# Patient Record
Sex: MALE | Employment: FULL TIME | URBAN - METROPOLITAN AREA
[De-identification: names, ages, dates, MRNs, and addresses within clinical notes are randomized per-mention and may not be internally consistent; named-entity substitution may affect disease eponyms.]

---
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
## 2022-04-29 ENCOUNTER — APPOINTMENT (OUTPATIENT)
Dept: GENERAL RADIOLOGY | Age: 22
End: 2022-04-29

## 2022-04-29 ENCOUNTER — ANESTHESIA (OUTPATIENT)
Dept: OPERATING ROOM | Age: 22
End: 2022-04-29

## 2022-04-29 ENCOUNTER — HOSPITAL ENCOUNTER (EMERGENCY)
Age: 22
Discharge: HOME | End: 2022-04-29

## 2022-04-29 ENCOUNTER — APPOINTMENT (OUTPATIENT)
Dept: CT IMAGING | Age: 22
End: 2022-04-29

## 2022-04-29 ENCOUNTER — ANESTHESIA EVENT (OUTPATIENT)
Dept: OPERATING ROOM | Age: 22
End: 2022-04-29

## 2022-04-29 VITALS
TEMPERATURE: 96.4 F | OXYGEN SATURATION: 95 % | RESPIRATION RATE: 18 BRPM | SYSTOLIC BLOOD PRESSURE: 100 MMHG | DIASTOLIC BLOOD PRESSURE: 51 MMHG

## 2022-04-29 VITALS
WEIGHT: 195.4 LBS | SYSTOLIC BLOOD PRESSURE: 140 MMHG | TEMPERATURE: 97.9 F | OXYGEN SATURATION: 97 % | DIASTOLIC BLOOD PRESSURE: 89 MMHG | RESPIRATION RATE: 16 BRPM | BODY MASS INDEX: 28.94 KG/M2 | HEIGHT: 69 IN | HEART RATE: 82 BPM
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
## 2022-04-29 DIAGNOSIS — S62.353B: Primary | ICD-10-CM

## 2022-04-29 PROBLEM — S60.552A FOREIGN BODY OF LEFT HAND: Status: ACTIVE | Noted: 2022-04-29

## 2022-04-29 LAB
A/G RATIO: 2.5 (ref 1.1–2.2)
ALBUMIN SERPL-MCNC: 5 G/DL (ref 3.4–5)
ALP BLD-CCNC: 45 U/L (ref 40–129)
ALT SERPL-CCNC: 23 U/L (ref 10–40)
ANION GAP SERPL CALCULATED.3IONS-SCNC: 12 MMOL/L (ref 3–16)
AST SERPL-CCNC: 22 U/L (ref 15–37)
BASOPHILS ABSOLUTE: 0 K/UL (ref 0–0.2)
BASOPHILS RELATIVE PERCENT: 0.3 %
BILIRUB SERPL-MCNC: 0.3 MG/DL (ref 0–1)
BUN BLDV-MCNC: 15 MG/DL (ref 7–20)
CALCIUM SERPL-MCNC: 9.9 MG/DL (ref 8.3–10.6)
CHLORIDE BLD-SCNC: 101 MMOL/L (ref 99–110)
CO2: 24 MMOL/L (ref 21–32)
CREAT SERPL-MCNC: 0.8 MG/DL (ref 0.9–1.3)
EOSINOPHILS ABSOLUTE: 0 K/UL (ref 0–0.6)
EOSINOPHILS RELATIVE PERCENT: 0.5 %
GFR AFRICAN AMERICAN: >60
GFR NON-AFRICAN AMERICAN: >60
GLUCOSE BLD-MCNC: 122 MG/DL (ref 70–99)
HCT VFR BLD CALC: 45.5 % (ref 40.5–52.5)
HEMOGLOBIN: 15.5 G/DL (ref 13.5–17.5)
LYMPHOCYTES ABSOLUTE: 1 K/UL (ref 1–5.1)
LYMPHOCYTES RELATIVE PERCENT: 13.1 %
MCH RBC QN AUTO: 28.4 PG (ref 26–34)
MCHC RBC AUTO-ENTMCNC: 34 G/DL (ref 31–36)
MCV RBC AUTO: 83.4 FL (ref 80–100)
MONOCYTES ABSOLUTE: 0.3 K/UL (ref 0–1.3)
MONOCYTES RELATIVE PERCENT: 4.4 %
NEUTROPHILS ABSOLUTE: 6.4 K/UL (ref 1.7–7.7)
NEUTROPHILS RELATIVE PERCENT: 81.7 %
PDW BLD-RTO: 13.2 % (ref 12.4–15.4)
PLATELET # BLD: 173 K/UL (ref 135–450)
PMV BLD AUTO: 9.3 FL (ref 5–10.5)
POTASSIUM REFLEX MAGNESIUM: 4 MMOL/L (ref 3.5–5.1)
RBC # BLD: 5.45 M/UL (ref 4.2–5.9)
SODIUM BLD-SCNC: 137 MMOL/L (ref 136–145)
TOTAL PROTEIN: 7 G/DL (ref 6.4–8.2)
WBC # BLD: 7.9 K/UL (ref 4–11)

## 2022-04-29 PROCEDURE — 73130 X-RAY EXAM OF HAND: CPT

## 2022-04-29 PROCEDURE — 6360000002 HC RX W HCPCS: Performed by: REGISTERED NURSE

## 2022-04-29 PROCEDURE — 85025 COMPLETE CBC W/AUTO DIFF WBC: CPT

## 2022-04-29 PROCEDURE — 2580000003 HC RX 258: Performed by: ORTHOPAEDIC SURGERY

## 2022-04-29 PROCEDURE — 96372 THER/PROPH/DIAG INJ SC/IM: CPT

## 2022-04-29 PROCEDURE — 73200 CT UPPER EXTREMITY W/O DYE: CPT

## 2022-04-29 PROCEDURE — 3600000012 HC SURGERY LEVEL 2 ADDTL 15MIN: Performed by: ORTHOPAEDIC SURGERY

## 2022-04-29 PROCEDURE — 3700000001 HC ADD 15 MINUTES (ANESTHESIA): Performed by: ORTHOPAEDIC SURGERY

## 2022-04-29 PROCEDURE — 80053 COMPREHEN METABOLIC PANEL: CPT

## 2022-04-29 PROCEDURE — 96375 TX/PRO/DX INJ NEW DRUG ADDON: CPT

## 2022-04-29 PROCEDURE — 96374 THER/PROPH/DIAG INJ IV PUSH: CPT

## 2022-04-29 PROCEDURE — 7100000001 HC PACU RECOVERY - ADDTL 15 MIN: Performed by: ORTHOPAEDIC SURGERY

## 2022-04-29 PROCEDURE — 36415 COLL VENOUS BLD VENIPUNCTURE: CPT

## 2022-04-29 PROCEDURE — 11011 DEBRIDE SKIN MUSC AT FX SITE: CPT | Performed by: ORTHOPAEDIC SURGERY

## 2022-04-29 PROCEDURE — 99285 EMERGENCY DEPT VISIT HI MDM: CPT

## 2022-04-29 PROCEDURE — 90715 TDAP VACCINE 7 YRS/> IM: CPT | Performed by: PHYSICIAN ASSISTANT

## 2022-04-29 PROCEDURE — APPNB45 APP NON BILLABLE 31-45 MINUTES: Performed by: NURSE PRACTITIONER

## 2022-04-29 PROCEDURE — 20525 RMVL FB MUSC/TDN DEEP/COMP: CPT | Performed by: ORTHOPAEDIC SURGERY

## 2022-04-29 PROCEDURE — 90471 IMMUNIZATION ADMIN: CPT | Performed by: PHYSICIAN ASSISTANT

## 2022-04-29 PROCEDURE — 3700000000 HC ANESTHESIA ATTENDED CARE: Performed by: ORTHOPAEDIC SURGERY

## 2022-04-29 PROCEDURE — 3600000002 HC SURGERY LEVEL 2 BASE: Performed by: ORTHOPAEDIC SURGERY

## 2022-04-29 PROCEDURE — 73120 X-RAY EXAM OF HAND: CPT

## 2022-04-29 PROCEDURE — 6370000000 HC RX 637 (ALT 250 FOR IP): Performed by: ORTHOPAEDIC SURGERY

## 2022-04-29 PROCEDURE — 26600 TREAT METACARPAL FRACTURE: CPT | Performed by: ORTHOPAEDIC SURGERY

## 2022-04-29 PROCEDURE — 3600000003 HC SURGERY LEVEL 3 BASE: Performed by: ORTHOPAEDIC SURGERY

## 2022-04-29 PROCEDURE — 7100000011 HC PHASE II RECOVERY - ADDTL 15 MIN: Performed by: ORTHOPAEDIC SURGERY

## 2022-04-29 PROCEDURE — 7100000000 HC PACU RECOVERY - FIRST 15 MIN: Performed by: ORTHOPAEDIC SURGERY

## 2022-04-29 PROCEDURE — 2500000003 HC RX 250 WO HCPCS: Performed by: REGISTERED NURSE

## 2022-04-29 PROCEDURE — 2580000003 HC RX 258: Performed by: REGISTERED NURSE

## 2022-04-29 PROCEDURE — 99223 1ST HOSP IP/OBS HIGH 75: CPT | Performed by: ORTHOPAEDIC SURGERY

## 2022-04-29 PROCEDURE — 2709999900 HC NON-CHARGEABLE SUPPLY: Performed by: ORTHOPAEDIC SURGERY

## 2022-04-29 PROCEDURE — 7100000010 HC PHASE II RECOVERY - FIRST 15 MIN: Performed by: ORTHOPAEDIC SURGERY

## 2022-04-29 PROCEDURE — 3600000013 HC SURGERY LEVEL 3 ADDTL 15MIN: Performed by: ORTHOPAEDIC SURGERY

## 2022-04-29 PROCEDURE — 6360000002 HC RX W HCPCS: Performed by: PHYSICIAN ASSISTANT

## 2022-04-29 PROCEDURE — 2500000003 HC RX 250 WO HCPCS: Performed by: ORTHOPAEDIC SURGERY

## 2022-04-29 RX ORDER — DEXAMETHASONE SODIUM PHOSPHATE 4 MG/ML
INJECTION, SOLUTION INTRA-ARTICULAR; INTRALESIONAL; INTRAMUSCULAR; INTRAVENOUS; SOFT TISSUE PRN
Status: DISCONTINUED | OUTPATIENT
Start: 2022-04-29 | End: 2022-04-29 | Stop reason: SDUPTHER

## 2022-04-29 RX ORDER — ONDANSETRON 2 MG/ML
4 INJECTION INTRAMUSCULAR; INTRAVENOUS ONCE
Status: COMPLETED | OUTPATIENT
Start: 2022-04-29 | End: 2022-04-29

## 2022-04-29 RX ORDER — LIDOCAINE HYDROCHLORIDE 10 MG/ML
1 INJECTION, SOLUTION EPIDURAL; INFILTRATION; INTRACAUDAL; PERINEURAL
Status: CANCELLED | OUTPATIENT
Start: 2022-04-29 | End: 2022-04-29

## 2022-04-29 RX ORDER — ONDANSETRON 2 MG/ML
4 INJECTION INTRAMUSCULAR; INTRAVENOUS
Status: DISCONTINUED | OUTPATIENT
Start: 2022-04-29 | End: 2022-04-29 | Stop reason: HOSPADM

## 2022-04-29 RX ORDER — LABETALOL HYDROCHLORIDE 5 MG/ML
5 INJECTION, SOLUTION INTRAVENOUS
Status: DISCONTINUED | OUTPATIENT
Start: 2022-04-29 | End: 2022-04-29 | Stop reason: HOSPADM

## 2022-04-29 RX ORDER — FENTANYL CITRATE 50 UG/ML
INJECTION, SOLUTION INTRAMUSCULAR; INTRAVENOUS PRN
Status: DISCONTINUED | OUTPATIENT
Start: 2022-04-29 | End: 2022-04-29 | Stop reason: SDUPTHER

## 2022-04-29 RX ORDER — BUPIVACAINE HYDROCHLORIDE 5 MG/ML
INJECTION, SOLUTION EPIDURAL; INTRACAUDAL
Status: COMPLETED | OUTPATIENT
Start: 2022-04-29 | End: 2022-04-29

## 2022-04-29 RX ORDER — CEPHALEXIN 500 MG/1
500 CAPSULE ORAL 4 TIMES DAILY
Qty: 28 CAPSULE | Refills: 0 | OUTPATIENT
Start: 2022-04-29 | End: 2022-04-29 | Stop reason: SDUPTHER

## 2022-04-29 RX ORDER — ONDANSETRON 2 MG/ML
INJECTION INTRAMUSCULAR; INTRAVENOUS PRN
Status: DISCONTINUED | OUTPATIENT
Start: 2022-04-29 | End: 2022-04-29 | Stop reason: SDUPTHER

## 2022-04-29 RX ORDER — MIDAZOLAM HYDROCHLORIDE 1 MG/ML
INJECTION INTRAMUSCULAR; INTRAVENOUS PRN
Status: DISCONTINUED | OUTPATIENT
Start: 2022-04-29 | End: 2022-04-29 | Stop reason: SDUPTHER

## 2022-04-29 RX ORDER — HYDROMORPHONE HCL 110MG/55ML
0.25 PATIENT CONTROLLED ANALGESIA SYRINGE INTRAVENOUS EVERY 5 MIN PRN
Status: DISCONTINUED | OUTPATIENT
Start: 2022-04-29 | End: 2022-04-29 | Stop reason: HOSPADM

## 2022-04-29 RX ORDER — MORPHINE SULFATE 4 MG/ML
4 INJECTION, SOLUTION INTRAMUSCULAR; INTRAVENOUS ONCE
Status: COMPLETED | OUTPATIENT
Start: 2022-04-29 | End: 2022-04-29

## 2022-04-29 RX ORDER — HYDROCODONE BITARTRATE AND ACETAMINOPHEN 5; 325 MG/1; MG/1
1 TABLET ORAL EVERY 8 HOURS PRN
Qty: 10 TABLET | Refills: 0 | OUTPATIENT
Start: 2022-04-29 | End: 2022-04-29 | Stop reason: SDUPTHER

## 2022-04-29 RX ORDER — HYDROMORPHONE HCL 110MG/55ML
0.5 PATIENT CONTROLLED ANALGESIA SYRINGE INTRAVENOUS EVERY 5 MIN PRN
Status: DISCONTINUED | OUTPATIENT
Start: 2022-04-29 | End: 2022-04-29 | Stop reason: HOSPADM

## 2022-04-29 RX ORDER — CEPHALEXIN 500 MG/1
500 CAPSULE ORAL 4 TIMES DAILY
Qty: 28 CAPSULE | Refills: 0 | Status: SHIPPED | OUTPATIENT
Start: 2022-04-29 | End: 2022-05-06

## 2022-04-29 RX ORDER — HYDROCODONE BITARTRATE AND ACETAMINOPHEN 5; 325 MG/1; MG/1
1 TABLET ORAL EVERY 8 HOURS PRN
Qty: 10 TABLET | Refills: 0 | Status: SHIPPED | OUTPATIENT
Start: 2022-04-29 | End: 2022-05-02

## 2022-04-29 RX ORDER — SODIUM CHLORIDE 9 MG/ML
INJECTION, SOLUTION INTRAVENOUS CONTINUOUS
Status: DISCONTINUED | OUTPATIENT
Start: 2022-04-29 | End: 2022-04-29 | Stop reason: HOSPADM

## 2022-04-29 RX ORDER — LIDOCAINE HYDROCHLORIDE 20 MG/ML
INJECTION, SOLUTION EPIDURAL; INFILTRATION; INTRACAUDAL; PERINEURAL PRN
Status: DISCONTINUED | OUTPATIENT
Start: 2022-04-29 | End: 2022-04-29 | Stop reason: SDUPTHER

## 2022-04-29 RX ORDER — KETAMINE HCL IN NACL, ISO-OSM 100MG/10ML
SYRINGE (ML) INJECTION PRN
Status: DISCONTINUED | OUTPATIENT
Start: 2022-04-29 | End: 2022-04-29 | Stop reason: SDUPTHER

## 2022-04-29 RX ORDER — SODIUM CHLORIDE 9 MG/ML
INJECTION, SOLUTION INTRAVENOUS CONTINUOUS
Status: CANCELLED | OUTPATIENT
Start: 2022-04-29

## 2022-04-29 RX ORDER — OXYCODONE HYDROCHLORIDE 5 MG/1
5 TABLET ORAL
Status: DISCONTINUED | OUTPATIENT
Start: 2022-04-29 | End: 2022-04-29 | Stop reason: HOSPADM

## 2022-04-29 RX ORDER — PROPOFOL 10 MG/ML
INJECTION, EMULSION INTRAVENOUS PRN
Status: DISCONTINUED | OUTPATIENT
Start: 2022-04-29 | End: 2022-04-29 | Stop reason: SDUPTHER

## 2022-04-29 RX ORDER — SUCCINYLCHOLINE/SOD CL,ISO/PF 200MG/10ML
SYRINGE (ML) INTRAVENOUS PRN
Status: DISCONTINUED | OUTPATIENT
Start: 2022-04-29 | End: 2022-04-29 | Stop reason: SDUPTHER

## 2022-04-29 RX ORDER — SODIUM CHLORIDE 9 MG/ML
INJECTION, SOLUTION INTRAVENOUS CONTINUOUS PRN
Status: DISCONTINUED | OUTPATIENT
Start: 2022-04-29 | End: 2022-04-29 | Stop reason: SDUPTHER

## 2022-04-29 RX ORDER — BACITRACIN ZINC AND POLYMYXIN B SULFATE 500; 1000 [USP'U]/G; [USP'U]/G
OINTMENT TOPICAL
Status: COMPLETED | OUTPATIENT
Start: 2022-04-29 | End: 2022-04-29

## 2022-04-29 RX ADMIN — DEXAMETHASONE SODIUM PHOSPHATE 10 MG: 4 INJECTION, SOLUTION INTRAMUSCULAR; INTRAVENOUS at 14:13

## 2022-04-29 RX ADMIN — FENTANYL CITRATE 50 MCG: 50 INJECTION, SOLUTION INTRAMUSCULAR; INTRAVENOUS at 14:24

## 2022-04-29 RX ADMIN — ONDANSETRON 4 MG: 2 INJECTION INTRAMUSCULAR; INTRAVENOUS at 12:23

## 2022-04-29 RX ADMIN — SODIUM CHLORIDE: 9 INJECTION, SOLUTION INTRAVENOUS at 13:58

## 2022-04-29 RX ADMIN — MIDAZOLAM 2 MG: 1 INJECTION INTRAMUSCULAR; INTRAVENOUS at 14:02

## 2022-04-29 RX ADMIN — SODIUM CHLORIDE: 9 INJECTION, SOLUTION INTRAVENOUS at 14:43

## 2022-04-29 RX ADMIN — FENTANYL CITRATE 50 MCG: 50 INJECTION, SOLUTION INTRAMUSCULAR; INTRAVENOUS at 14:08

## 2022-04-29 RX ADMIN — CEFAZOLIN SODIUM 2000 MG: 10 INJECTION, POWDER, FOR SOLUTION INTRAVENOUS at 12:43

## 2022-04-29 RX ADMIN — Medication 30 MG: at 14:13

## 2022-04-29 RX ADMIN — Medication 160 MG: at 14:08

## 2022-04-29 RX ADMIN — Medication 10 MG: at 14:36

## 2022-04-29 RX ADMIN — ONDANSETRON 4 MG: 2 INJECTION INTRAMUSCULAR; INTRAVENOUS at 14:13

## 2022-04-29 RX ADMIN — LIDOCAINE HYDROCHLORIDE 100 MG: 20 INJECTION, SOLUTION EPIDURAL; INFILTRATION; INTRACAUDAL; PERINEURAL at 14:08

## 2022-04-29 RX ADMIN — MORPHINE SULFATE 4 MG: 4 INJECTION INTRAVENOUS at 12:22

## 2022-04-29 RX ADMIN — TETANUS TOXOID, REDUCED DIPHTHERIA TOXOID AND ACELLULAR PERTUSSIS VACCINE, ADSORBED 0.5 ML: 5; 2.5; 8; 8; 2.5 SUSPENSION INTRAMUSCULAR at 12:18

## 2022-04-29 RX ADMIN — SODIUM CHLORIDE: 9 INJECTION, SOLUTION INTRAVENOUS at 14:04

## 2022-04-29 RX ADMIN — PROPOFOL 200 MG: 10 INJECTION, EMULSION INTRAVENOUS at 14:08

## 2022-04-29 ASSESSMENT — PULMONARY FUNCTION TESTS
PIF_VALUE: 27
PIF_VALUE: 1
PIF_VALUE: 12
PIF_VALUE: 16
PIF_VALUE: 20
PIF_VALUE: 18
PIF_VALUE: 19
PIF_VALUE: 6
PIF_VALUE: 20
PIF_VALUE: 4
PIF_VALUE: 19
PIF_VALUE: 13
PIF_VALUE: 20
PIF_VALUE: 18
PIF_VALUE: 1
PIF_VALUE: 13
PIF_VALUE: 1
PIF_VALUE: 14
PIF_VALUE: 16
PIF_VALUE: 18
PIF_VALUE: 1
PIF_VALUE: 19
PIF_VALUE: 19
PIF_VALUE: 13
PIF_VALUE: 14
PIF_VALUE: 19
PIF_VALUE: 20
PIF_VALUE: 15
PIF_VALUE: 20
PIF_VALUE: 12
PIF_VALUE: 20
PIF_VALUE: 20
PIF_VALUE: 5
PIF_VALUE: 19
PIF_VALUE: 5
PIF_VALUE: 15
PIF_VALUE: 14
PIF_VALUE: 16
PIF_VALUE: 20
PIF_VALUE: 1
PIF_VALUE: 19
PIF_VALUE: 15
PIF_VALUE: 18
PIF_VALUE: 0
PIF_VALUE: 20
PIF_VALUE: 18
PIF_VALUE: 20
PIF_VALUE: 17
PIF_VALUE: 19
PIF_VALUE: 18
PIF_VALUE: 12
PIF_VALUE: 14
PIF_VALUE: 1
PIF_VALUE: 19
PIF_VALUE: 20
PIF_VALUE: 19
PIF_VALUE: 11
PIF_VALUE: 13
PIF_VALUE: 13

## 2022-04-29 ASSESSMENT — PAIN SCALES - GENERAL
PAINLEVEL_OUTOF10: 6
PAINLEVEL_OUTOF10: 8
PAINLEVEL_OUTOF10: 7
PAINLEVEL_OUTOF10: 6
PAINLEVEL_OUTOF10: 7

## 2022-04-29 ASSESSMENT — ENCOUNTER SYMPTOMS
NAUSEA: 0
SHORTNESS OF BREATH: 0
VOMITING: 0
RESPIRATORY NEGATIVE: 1
COLOR CHANGE: 0
CHEST TIGHTNESS: 0
DIARRHEA: 0
BACK PAIN: 0
COUGH: 0
CONSTIPATION: 0
ABDOMINAL PAIN: 0

## 2022-04-29 ASSESSMENT — PAIN DESCRIPTION - ORIENTATION: ORIENTATION: LOWER

## 2022-04-29 ASSESSMENT — PAIN DESCRIPTION - PAIN TYPE: TYPE: ACUTE PAIN

## 2022-04-29 ASSESSMENT — PAIN DESCRIPTION - FREQUENCY: FREQUENCY: CONTINUOUS

## 2022-04-29 ASSESSMENT — LIFESTYLE VARIABLES: SMOKING_STATUS: 1

## 2022-04-29 ASSESSMENT — PAIN DESCRIPTION - LOCATION: LOCATION: HAND

## 2022-04-29 ASSESSMENT — PAIN - FUNCTIONAL ASSESSMENT: PAIN_FUNCTIONAL_ASSESSMENT: 0-10

## 2022-04-29 NOTE — ED NOTES
Labs obtained for Left AC. Lab called requested STAT, informed consent obtained sent with transportation. Patient transported to surgery at this time in stable condition.      Arnold Guerrero RN  04/29/22 9799

## 2022-04-29 NOTE — PROGRESS NOTES
Received from [address] mask,shivers,semi fowlers,respirations easy and regular,left hand dressing/splint/ace dry and intact,elevated,ice pack placed,circulation good,warmer,vss.

## 2022-04-29 NOTE — ANESTHESIA PRE PROCEDURE
Department of Anesthesiology  Preprocedure Note       Name:  Lidia Fleming   Age:  25 y.o.  :  2000                                          MRN:  [id number]         Date:  2022      Surgeon: Mercedes Mtz):  Gabriel Don MD    Procedure: Procedure(s):  INCISION AND DRAINAGE LEFT HAND, REMOVAL FOREIGN BODY / NAIL    Medications prior to admission:   Prior to Admission medications    Medication Sig Start Date End Date Taking? Authorizing Provider   UNABLE TO FIND Per patient He is not on any OTC medication at this time. Historical Provider, MD       Current medications:    Current Facility-Administered Medications   Medication Dose Route Frequency Provider Last Rate Last Admin    HYDROmorphone (DILAUDID) injection 0.25 mg  0.25 mg IntraVENous Q5 Min PRN Taty Liao MD        HYDROmorphone (DILAUDID) injection 0.5 mg  0.5 mg IntraVENous Q5 Min PRN Taty Liao MD        oxyCODONE (ROXICODONE) immediate release tablet 5 mg  5 mg Oral Once PRN Taty Liao MD        ondansetron [facility]) injection 4 mg  4 mg IntraVENous Once PRN Taty Liao MD        labetalol (NORMODYNE;TRANDATE) injection 5 mg  5 mg IntraVENous Q15 Min PRN Taty Liao MD        0.9 % sodium chloride infusion   IntraVENous Continuous Taty Liao MD           Allergies:  No Known Allergies    Problem List:  There is no problem list on file for this patient. Past Medical History:  History reviewed. No pertinent past medical history. Past Surgical History:  History reviewed. No pertinent surgical history.     Social History:    Social History     Tobacco Use    Smoking status: Current Every Day Smoker     Packs/day: 0.10     Years: 2.00     Pack years: 0.20     Types: Cigarettes, Cigars    Smokeless tobacco: Never Used    Tobacco comment: 1 cigar per day   Substance Use Topics    Alcohol use: Yes     Comment: weekends                                Ready to quit: Not Answered  Counseling given: Not Answered  Comment: 1 cigar per day      Vital Signs (Current):   Vitals:    04/29/22 1131 04/29/22 1327 04/29/22 1335   BP: (!) 139/91 122/88 (!) (P) 143/101   Pulse: 74 80 (P) 67   Resp: 18 18 (P) 20   Temp: 97.5 °F (36.4 °C) 98 °F (36.7 °C) (P) 99.2 °F (37.3 °C)   TempSrc:  Oral (P) Temporal   SpO2: 99% 98%    Weight: 195 lb (88.5 kg)                                                BP Readings from Last 3 Encounters:   04/29/22 (!) (P) 143/101   10/30/19 117/70   10/21/19 108/71       NPO Status:                                                                                 BMI:   Wt Readings from Last 3 Encounters:   04/29/22 195 lb (88.5 kg)   10/30/19 170 lb (77.1 kg) (72 %, Z= 0.57)*   10/21/19 170 lb (77.1 kg) (72 %, Z= 0.58)*     * Growth percentiles are based on CDC (Boys, 2-20 Years) data. Body mass index is 28.8 kg/m². CBC:   Lab Results   Component Value Date    WBC 7.9 04/29/2022    RBC 5.45 04/29/2022    HGB 15.5 04/29/2022    HCT 45.5 04/29/2022    MCV 83.4 04/29/2022    RDW 13.2 04/29/2022     04/29/2022       CMP: No results found for: NA, K, CL, CO2, BUN, CREATININE, GFRAA, AGRATIO, LABGLOM, GLUCOSE, GLU, PROT, CALCIUM, BILITOT, ALKPHOS, AST, ALT    POC Tests: No results for input(s): POCGLU, POCNA, POCK, POCCL, POCBUN, POCHEMO, POCHCT in the last 72 hours.     Coags: No results found for: PROTIME, INR, APTT    HCG (If Applicable): No results found for: PREGTESTUR, PREGSERUM, HCG, HCGQUANT     ABGs: No results found for: PHART, PO2ART, VJH9QAO, SVV6TTD, BEART, T3VMWXGS     Type & Screen (If Applicable):  No results found for: LABABO, LABRH    Drug/Infectious Status (If Applicable):  No results found for: HIV, HEPCAB    COVID-19 Screening (If Applicable): No results found for: COVID19        Anesthesia Evaluation  Patient summary reviewed and Nursing notes reviewed no history of anesthetic complications:   Airway: Mallampati: III  TM distance: >3 FB Neck ROM: full  Mouth opening: > = 3 FB Dental: normal exam         Pulmonary: breath sounds clear to auscultation  (+) current smoker    (-) rhonchi                           Cardiovascular:  Exercise tolerance: good (>4 METS),       (-) CABG/stent, dysrhythmias and  angina      Rhythm: regular  Rate: normal                    Neuro/Psych:      (-) seizures, TIA and CVA           GI/Hepatic/Renal:        (-) GERD       Endo/Other:                      ROS comment: No FH of problems with GA Abdominal:             Vascular: negative vascular ROS. Other Findings:             Anesthesia Plan      general     ASA 2 - emergent     (Cricoid pressure. Lennie Rose for intubation)  Induction: intravenous and rapid sequence. MIPS: Postoperative opioids intended and Prophylactic antiemetics administered. Anesthetic plan and risks discussed with patient. Plan discussed with CRNA.                   Sanna Vera MD   4/29/2022

## 2022-04-29 NOTE — PROGRESS NOTES
Teaching/ education completed for home care including pain management, wound care,activities,safety precautions and infection control. Patient and GF verbalized understanding.

## 2022-04-29 NOTE — CONSULTS
[facility]  Consult Note    Patient: Adiel Vaughn  Admit Date: 4/29/2022  Requesting Physician: Kan Pal PA-C  Room: ED-0022/22    Chief complaint: LEFT hand pain after being struck by nail gun    HPI: Adiel Vaughn is a 25 y.o. male who presented to [address] [place] [address] today after he sustained a pneumatic nail gun injury to the left hand approx. 30 minutes prior to arrival.  He is RHD. He smokes only socially (a few/week). Denies any medical history. Describes pain in the left hand of moderate to severe intensity and of aching nature since the injury which is relieved by morphine moderately. Denies new numbness/tingling. Imaging review of the left hand via plain films demonstrated: nondisplaced intra-articular fracture of the proximal 3rd metacarpal.  There is retained nail exiting the palmar ulnar side with the head of the nail embedded within the soft tissue. Patient uses no assistive devices to ambulate. Medical History:  History reviewed. No pertinent past medical history. History reviewed. No pertinent surgical history. Social History:    reports that he has been smoking cigarettes and cigars. He has a 0.20 pack-year smoking history. He has never used smokeless tobacco.    Family History:        Problem Relation Age of Onset    Depression Mother     Kidney Disease Mother     Alcohol Abuse Father     Kidney Disease Father     Depression Sister     Alcohol Abuse Brother     Stroke Maternal Grandmother     High Blood Pressure Maternal Grandmother     Alcohol Abuse Maternal Grandmother        Medications:  ALL MEDICATIONS HAVE BEEN REVIEWED:  Scheduled:   ceFAZolin  2,000 mg IntraVENous Once     Continuous:  PRN:    Allergies: No Known Allergies    Review of Systems:  Constitutional: Negative for fever, chills, fatigue. Skin:  Negative for pruritis, rash  Eyes: Negative for photophobia and visual disturbance.    ENT:  Negative for rhinorrhea, epistaxis, sore throat  Respiratory:  Negative for cough and shortness of breath. Cardiovascular: Negative for chest pain. Gastrointestinal: Negative for nausea, vomiting, diarrhea. Genitourinary: Negative for dysuria and difficulty urinating. Neurological: Negative for confusion, dysarthria, tremors, seizures. Psychiatric:  Negative for depression or anxiety  Musculoskeletal:  Positive for LEFT hand pain. Objective:  Vitals:    04/29/22 1131   BP: (!) 139/91   Pulse: 74   Resp: 18   Temp: 97.5 °F (36.4 °C)   SpO2: 99%      Physical Examination:  GENERAL: No apparent distress, well-nourished  SKIN:  Warm and dry  EYES: Nonicteric. ENT: Mucous membranes moist  HEAD: Normocephalic, atraumatic  RESPIRATORY: Resp easy and unlabored  CARDIOVASCULAR: Regular rate and rhythm  GI: Abdomen soft, nontender  NEURO: Awake and alert. No speech defect  PSYCHIATRIC: Appropriate affect; not agitated  MUSCULOSKELETAL:  LEFT hand  Inspection: On exam there are no ulcerations, rashes or lesions about the left hand other than the entry wound on the . There is pain to palpation of the left hand surrounding the entry and exit wounds. No active bleeding but dried blood noted throughout the hand on both palmar and dorsal sides. Motor: Intact flexion and extension of all digits and wrist with limited overall ROM secondary to pain and retained nail. Sensation: Grossly intact to light touch throughout the left velazquez din all digits including median, ulnar and radial distributions. Vascular:  2+ left radial pulse with brisk cap refill in all digits. Labs reviewed:  No results for input(s): WBC, HGB, HCT, PLT in the last 72 hours. No results for input(s): NA, K, CL, CO2, BUN, CREATININE, GLUCOSE, CALCIUM, MG, PHOS in the last 72 hours. No results for input(s): INR, PROTIME in the last 72 hours.     Lab Results   Component Value Date    COLORU YELLOW 10/21/2019    CLARITYU CLOUDY (A) 10/21/2019    PHUR 6.0 10/21/2019 GLUCOSEU Negative 10/21/2019    BLOODU Negative 10/21/2019    LEUKOCYTESUR LARGE (A) 10/21/2019    BILIRUBINUR Negative 10/21/2019    UROBILINOGEN 0.2 10/21/2019    RBCUA 6 (H) 10/21/2019    WBCUA 136 (H) 10/21/2019       Imaging:  XR HAND LEFT (MIN 3 VIEWS)   Final Result   Traumatic, acute, nondisplaced intra-articular fractures of the proximal 3rd   metacarpal.      Metallic foreign body/framing nail in the palm. CT HAND LEFT WO CONTRAST    (Results Pending)       IMPRESSION:  LEFT hand pneumatic nail injury with retained foreign body and nondisplaced fracture 3rd proximal metacarpal shaft with intra-articualr extenson  Active Problems:    * No active hospital problems. *  Resolved Problems:    * No resolved hospital problems. *      RECOMMENDATIONS:  We discussed both operative and non-operative treatments with our recommendation being for formal I&D with foreign body removal with subsequent irrigation and debridement. After discussion of risks and benefits, the patiet agreed to proceed with surgery. - Schedule for LEFT hand foreign body removal with irrigation and debridement with Dr. Henri Lovell this afternoon  - NPO  - Pain control  - DVT prophylaxis: none needed  - Ancef and tetanus booster now  - Plan on same day discharge with oral abx for d/c and outpatient follow-up with the hand team Gina Kaur PA-C @ [facility] Monday 5/2 @10:45AM)    Patient has been counseled about the detrimental effects of smoking and the need to eliminate or significantly decrease their tobacco use. I would suggest discussing this issue with their medical doctor. I would also highly recommend the immediate cessation of all forms of tobacco use. I have reviewed imaging and plan with Dr. Henri Lovell.       Jovanny Nielsen, APRN - CNP  4/29/2022  12:43 PM

## 2022-04-29 NOTE — ANESTHESIA POSTPROCEDURE EVALUATION
Department of Anesthesiology  Postprocedure Note    Patient: Philly Wick  MRN: [id number]  YOB: 2000  Date of evaluation: 4/29/2022  Time:  3:33 PM     Procedure Summary     Date: 04/29/22 Room / Location: [address]    Anesthesia Start: 7991 Anesthesia Stop: 6937    Procedure: INCISION AND DRAINAGE LEFT HAND, REMOVAL FOREIGN BODY / NAIL (Left ) Diagnosis: (LEFT HAND FOREIGN BODY)    Surgeons: Janelle Coleman MD Responsible Provider: Melva Gibbs MD    Anesthesia Type: general ASA Status: 2 - Emergent          Anesthesia Type: general    Julianne Phase I: Julianne Score: 4    Julianne Phase II:      Last vitals: Reviewed and per EMR flowsheets.        Anesthesia Post Evaluation    Patient location during evaluation: PACU  Patient participation: complete - patient participated  Level of consciousness: awake  Airway patency: patent  Nausea & Vomiting: no vomiting  Complications: no  Cardiovascular status: hemodynamically stable  Respiratory status: acceptable  Hydration status: euvolemic  Multimodal analgesia pain management approach

## 2022-04-29 NOTE — ED PROVIDER NOTES
[email or web address]        Pt Name: Edi Colunga  MRN: [id number]  [place] 2000  Date of evaluation: 4/29/2022  Provider: AVINASH Multani  PCP: No primary care provider on file. Note Started: 2:57 PM EDT       JOI. I have evaluated this patient. My supervising physician was available for consultation. CHIEF COMPLAINT       Chief Complaint   Patient presents with    Hand Injury     Nail to left palm approx 30 minutes ago, while using nail gun. Nail still inside hand; bleeding controlled. Will need tetanus. HISTORY OF PRESENT ILLNESS   (Location, Timing/Onset, Context/Setting, Quality, Duration, Modifying Factors, Severity, Associated Signs and Symptoms)  Note limiting factors. Chief Complaint: Left Hand Injury     Edi Colunga is a 25 y.o. male with no significant past medical history who presents to the ED with complaint of a left hand injury. Patient states he was using a nail gun when he states he sustained injury to his left hand. States nail went through the dorsal aspect of his left hand and came out the palmar aspect near the radial aspect of his left hand. He states he is right-hand dominant. States nail is still present in the left hand. He has bleeding which he is controlled with pressure. He states he has an aching pain rated 7/10 worse with palpation and movement. States decreased range of motion strength due to pain. Denies numbness or tingling. Denies fever chills. Denies any other injury or trauma throughout. Denies any rashes or lesions. Denies any other abrasion or laceration throughout. He states he is not on any blood thinners. He is unsure of his last tetanus vaccine. Nursing Notes were all reviewed and agreed with or any disagreements were addressed in the HPI.     REVIEW OF SYSTEMS    (2-9 systems for level 4, 10 or more for level 5)     Review of Systems   Constitutional: Negative for activity change, appetite change, chills, diaphoresis, fatigue and fever. Respiratory: Negative. Negative for cough, chest tightness and shortness of breath. Cardiovascular: Negative. Negative for chest pain, palpitations and leg swelling. Gastrointestinal: Negative for abdominal pain, constipation, diarrhea, nausea and vomiting. Genitourinary: Negative for difficulty urinating and dysuria. Musculoskeletal: Positive for arthralgias, joint swelling and myalgias. Negative for back pain, gait problem, neck pain and neck stiffness. Skin: Positive for wound. Negative for color change, pallor and rash. Neurological: Negative for dizziness, weakness, light-headedness and numbness. Positives and Pertinent negatives as per HPI. Except as noted above in the ROS, all other systems were reviewed and negative. PAST MEDICAL HISTORY   History reviewed. No pertinent past medical history. SURGICAL HISTORY   History reviewed. No pertinent surgical history. [address]       Current Discharge Medication List      CONTINUE these medications which have NOT CHANGED    Details   UNABLE TO FIND Per patient He is not on any OTC medication at this time. Associated Diagnoses: Tinea versicolor               ALLERGIES     Patient has no known allergies.     FAMILYHISTORY       Family History   Problem Relation Age of Onset    Depression Mother     Kidney Disease Mother     Alcohol Abuse Father     Kidney Disease Father     Depression Sister     Alcohol Abuse Brother     Stroke Maternal Grandmother     High Blood Pressure Maternal Grandmother     Alcohol Abuse Maternal Grandmother           SOCIAL HISTORY       Social History     Tobacco Use    Smoking status: Current Every Day Smoker     Packs/day: 0.10     Years: 2.00     Pack years: 0.20     Types: Cigarettes, Cigars    Smokeless tobacco: Never Used    Tobacco comment: 1 cigar per day   Substance Use Topics    Alcohol use: Yes     Comment: weekends  Drug use: No       SCREENINGS    Syed Coma Scale  Eye Opening: Spontaneous  Best Verbal Response: Oriented  Best Motor Response: Obeys commands  Syed Coma Scale Score: 15        PHYSICAL EXAM    (up to 7 for level 4, 8 or more for level 5)     ED Triage Vitals   BP Temp Temp Source Pulse Resp SpO2 Height Weight   04/29/22 1131 04/29/22 1131 04/29/22 1327 04/29/22 1131 04/29/22 1131 04/29/22 1131 04/29/22 1335 04/29/22 1131   (!) 139/91 97.5 °F (36.4 °C) Oral 74 18 99 % 5' 9\" (1.753 m) 195 lb (88.5 kg)       Physical Exam  Constitutional:       Appearance: He is well-developed. He is not diaphoretic. HENT:      Head: Normocephalic and atraumatic. Right Ear: External ear normal.      Left Ear: External ear normal.   Eyes:      General:         Right eye: No discharge. Left eye: No discharge. Pulmonary:      Effort: Pulmonary effort is normal. No respiratory distress. Breath sounds: No stridor. Musculoskeletal:         General: Normal range of motion. Cervical back: Normal range of motion and neck supple. Comments: Upon examination patient has what appears to be entry wound noted to the dorsal aspect of the left hand. Nail appears partially out over the ulnar palmar aspect of the left hand. Radial pulse and capillary refill brisk. Sensation intact to the radial ulnar aspects distal fingertips. Able to wiggle her fingertips without difficulty. Decreased range of motion and strength at the hand and wrist secondary to pain and swelling. There is edema noted. No ecchymosis, erythema or warmth. No other abrasion or laceration noted throughout. No rashes or lesions. Compartments are soft. Slight oozing blood noted from the wounds without any brisk arterial bleeding noted. Skin:     General: Skin is warm and dry. Coloration: Skin is not pale. Findings: No erythema. Neurological:      Mental Status: He is alert and oriented to person, place, and time. Psychiatric:         Behavior: Behavior normal.                   DIAGNOSTIC RESULTS   LABS:    Labs Reviewed   COMPREHENSIVE METABOLIC PANEL W/ REFLEX TO MG FOR LOW K - Abnormal; Notable for the following components:       Result Value    Glucose 122 (*)     CREATININE 0.8 (*)     Albumin/Globulin Ratio 2.5 (*)     All other components within normal limits   CBC WITH AUTO DIFFERENTIAL       When ordered only abnormal lab results are displayed. All other labs were within normal range or not returned as of this dictation. EKG: When ordered, EKG's are interpreted by the Emergency Department Physician in the absence of a cardiologist.  Please see their note for interpretation of EKG. RADIOLOGY:   Non-plain film images such as CT, Ultrasound and MRI are read by the radiologist. Plain radiographic images are visualized and preliminarily interpreted by the ED Provider with the below findings:        Interpretation per the Radiologist below, if available at the time of this note:    CT HAND LEFT WO CONTRAST   Final Result   1. Acute mildly comminuted nondisplaced fracture of the 3rd metacarpal shaft   and proximal metaphysis. 2. Metallic nail in the soft tissues palmar to the 5th metacarpal partially   involving the hypo thenar musculature. Adjacent soft tissue gas. Soft   tissue gas also seen dorsal to the 2nd through 4th metacarpals. XR HAND LEFT (MIN 3 VIEWS)   Final Result   Traumatic, acute, nondisplaced intra-articular fractures of the proximal 3rd   metacarpal.      Metallic foreign body/framing nail in the palm. XR HAND LEFT (MIN 3 VIEWS)    Result Date: 4/29/2022  EXAMINATION: THREE XRAY VIEWS OF THE LEFT HAND 4/29/2022 12:05 pm COMPARISON: None.  HISTORY: ORDERING SYSTEM PROVIDED HISTORY: inj TECHNOLOGIST PROVIDED HISTORY: Reason for exam:->inj Reason for Exam: left hand injury, nail gun FINDINGS: There are left jejunal fractures through the 3rd metacarpal base and proximal half the shaft.  The fractures communicate with the articular space. No displacement is seen. There is a metallic foreign body, framing nail projecting in the palm. The head of the nail appears embedded in soft tissues. Traumatic, acute, nondisplaced intra-articular fractures of the proximal 3rd metacarpal. Metallic foreign body/framing nail in the palm. CT HAND LEFT WO CONTRAST    Result Date: 4/29/2022  EXAMINATION: CT OF THE LEFT HAND WITHOUT CONTRAST 4/29/2022 12:45 pm TECHNIQUE: CT of the left hand was performed without the administration of intravenous contrast.  Multiplanar reformatted images are provided for review. Dose modulation, iterative reconstruction, and/or weight based adjustment of the mA/kV was utilized to reduce the radiation dose to as low as reasonably achievable. COMPARISON: None. HISTORY ORDERING SYSTEM PROVIDED HISTORY: [address] [place] [address] fracture TECHNOLOGIST PROVIDED HISTORY: Reason for exam:->[address] [place] [address] fracture Decision Support Exception - unselect if not a suspected or confirmed emergency medical condition->Emergency Medical Condition (MA) Reason for Exam: [address] [place] [address] fracture FINDINGS: Bones: There is an acute mildly comminuted nondisplaced fracture of the 3rd metacarpal shaft and proximal metaphysis. No other fracture identified. No dislocation. Soft Tissue: A metallic nail is seen within the soft tissues palmar to the 5th metacarpal shaft. A partially involves the hypothenar musculature. The visualized tendons are grossly intact. Soft tissue gas is seen about the nail and also dorsal to the 2nd through 4th metacarpals. Joint: No significant degenerative changes. No osseous erosions. 1. Acute mildly comminuted nondisplaced fracture of the 3rd metacarpal shaft and proximal metaphysis. 2. Metallic nail in the soft tissues palmar to the 5th metacarpal partially involving the hypo thenar musculature. Adjacent soft tissue gas.   Soft tissue gas also seen dorsal to the 2nd through 4th metacarpals. PROCEDURES   Unless otherwise noted below, none     Procedures    CRITICAL CARE TIME       CONSULTS:  IP CONSULT TO ORTHOPEDIC SURGERY      EMERGENCY DEPARTMENT COURSE and DIFFERENTIAL DIAGNOSIS/MDM:   Vitals:    Vitals:    04/29/22 1131 04/29/22 1327 04/29/22 1335 04/29/22 1359   BP: (!) 139/91 122/88 (!) 143/101    Pulse: 74 80 67 67   Resp: 18 18 20    Temp: 97.5 °F (36.4 °C) 98 °F (36.7 °C) 99.2 °F (37.3 °C)    TempSrc:  Oral Temporal    SpO2: 99% 98% 98%    Weight: 195 lb (88.5 kg)  195 lb 6.4 oz (88.6 kg)    Height:   5' 9\" (1.753 m)        Patient was given the following medications:  Medications   HYDROmorphone (DILAUDID) injection 0.25 mg (has no administration in time range)   HYDROmorphone (DILAUDID) injection 0.5 mg (has no administration in time range)   oxyCODONE (ROXICODONE) immediate release tablet 5 mg (has no administration in time range)   ondansetron (ZOFRAN) injection 4 mg (has no administration in time range)   labetalol (NORMODYNE;TRANDATE) injection 5 mg (has no administration in time range)   0.9 % sodium chloride infusion (has no administration in time range)   0.9 % sodium chloride infusion ( IntraVENous New Bag 4/29/22 1358)   Tetanus-Diphth-Acell Pertussis (BOOSTRIX) injection 0.5 mL (0.5 mLs IntraMUSCular Given 4/29/22 1218)   morphine injection 4 mg (4 mg IntraVENous Given 4/29/22 1222)   ondansetron (ZOFRAN) injection 4 mg (4 mg IntraVENous Given 4/29/22 1223)   ceFAZolin (ANCEF) 2000 mg in dextrose 5 % 50 mL IVPB ( IntraVENous Restarted 4/29/22 1256)   bupivacaine (PF) (MARCAINE) 0.5 % injection (3 mLs IntraDERmal Given 4/29/22 1443)   bacitracin-polymyxin b (POLYSPORIN) ointment (1 each Topical Given 4/29/22 1444)           Patient is a 26-year-old male who presents to the ED with complaint of a hand injury. Patient had injury by nail/nail gun to the left hand.   He has entry wound that appears to be to the dorsal aspect of the left hand with exit wound noted to the palmar ulnar aspect of the left hand. Nail still appears to be present within the hand at this time. There is some slight oozing blood at this time but no brisk arterial bleeding noted. Decreased range of motion and strength secondary to swelling and nail. Distal neurovascular intact. Patient's tetanus was updated. IV was established he was given morphine and Zofran for symptom control. Given Ancef for empiric antibiotic coverage. CBC showed normal white count, hemoglobin and platelets. CMP unremarkable. X-ray showed traumatic acute nondisplaced intra-articular fracture to the third metacarpal with metallic foreign body/framing nail noted in the palm. Did consult orthopedics and orthopedic service, Lianet Lopes, came and evaluated patient here in the emergency department. Requested CT of the hand. States will take patient to surgery for nail removal and washout. Patient will be admitted for surgery by orthopedics and discharged per their service. FINAL IMPRESSION      1. Open nondisplaced fracture of shaft of third metacarpal bone of left hand, initial encounter    2. Injury by nail gun, initial encounter          DISPOSITION/PLAN   DISPOSITION Decision To Admit 04/29/2022 01:15:56 PM      PATIENT REFERRED TO:  Lana Wynne PA-C  [address] [place] [address] Drive  [address] [place] [address]  [phone]    Go to  Monday, 5/2 @ 10:45AM      DISCHARGE MEDICATIONS:  Current Discharge Medication List      START taking these medications    Details   HYDROcodone-acetaminophen (NORCO) 5-325 MG per tablet Take 1 tablet by mouth every 8 hours as needed for Pain for up to 3 days. Intended supply: 3 days.  Take lowest dose possible to manage pain  Qty: 10 tablet, Refills: 0    Comments: Reduce doses taken as pain becomes manageable  Associated Diagnoses: Injury by nail gun, initial encounter      cephALEXin (KEFLEX) 500 MG capsule Take 1 capsule by mouth 4 times daily for 7 days  Qty: 28 capsule, Refills: 0             DISCONTINUED MEDICATIONS:  Current Discharge Medication List                 (Please note that portions of this note were completed with a voice recognition program.  Efforts were made to edit the dictations but occasionally words are mis-transcribed.)    AVINASH Mcnally (electronically signed)          AVINASH Howard  04/29/22 8683

## 2022-04-30 NOTE — OP NOTE
[place] [address]                     [facility], [facility]                                OPERATIVE REPORT    PATIENT NAME: Manohar Ewing                 :        2000  MED REC NO:   [id number]                          ROOM:       0022  ACCOUNT NO:   [de-identified]                           ADMIT DATE: 2022  PROVIDER:     Stuart Zhang MD    DATE OF PROCEDURE:  2022    PREOPERATIVE DIAGNOSES:  1. Pneumatic gun injury to the left hand. 2.  Retained foreign body (nail) in the left hand hypothenar eminence. 3.  Open left third metacarpal shaft fracture, nondisplaced. POSTOPERATIVE DIAGNOSES:  1. Pneumatic gun injury to the left hand. 2.  Retained foreign body (nail) in the left hand hypothenar eminence. 3.  Open left third metacarpal shaft fracture, nondisplaced. OPERATION PERFORMED:  1. Removal of left hand retained foreign body (nail) from hypothenar   eminence. 2.  Irrigation and debridement of the left hand open third metacarpal  shaft fracture, including skin, subcutaneous fat, and fascia. 3.  Closed treatment of the third metacarpal shaft fracture. OPERATING SURGEON:  Stuart Zhang MD    ANESTHESIA:  General endotracheal.    BLOOD LOSS:  Less than 50 mL. COMPLICATIONS:  None immediately apparent. DETAILS OF PROCEDURE:  The patient was brought back to the OR and  transferred onto the operating room table. General anesthesia was  administered. A non-sterile axillary tourniquet was applied and the  entire left upper extremity was subsequently prepped and draped in the  usual sterile fashion using Betadine scrub and paint. A full timeout  was performed with all parties in agreement. The patient did receive  Ancef in the emergency department. The left upper extremity was  elevated for a few minutes and the tourniquet was inflated to 250mmHg. The nail was protruding over the hypothenar eminence.   Using a #15 blade  scalpel, it was gently opened up to loosen the nail over the ulnar  aspect of the palmar hand. The exposed nail was gently loosened with a  needle , but the nail head deep in the hypothenar eminence/muscles   remained caught underneath the subcutaneous tissues and blunt dissection   was performed to loosen up the nail head before it could be completely   retreived. There were no broken fragments and the nail appeared to be   intact. The dorsal hand was then exposed and a longitudinal incision was made  over the open fracture site. The skin and subcutaneous fat as well as  some of the underlying fascia over the fracture site was excised using  the scalpel. The extensor tendon to the middle finger appeared to be  intact. Using Ragnell retractors, the fracture site was now exposed. There was no gross contamination at this area. Using cystoscopy tubing  and bulb syringe irrigation, a total of 3 liters of normal saline was  used to irrigate the wounds. Fluoroscopy was brought in, which   confirmed no remaining retained foreign material/nail, and good   maintained alignment of the third metacarpal fracture. At the conclusion of irrigation, the tourniquet was let down and  hemostasis was obtained with pressure. Brisk cap refill was seen in   all digits. Tenodesis effect was intact without scissoring. The skin   edges were infiltrated with plain Marcaine. The dorsal hand wound   corresponding to the open fracture site was closed with 3-0 nylon in a   horizontal mattress configuration. The nail removal site at the   hypothenar eminence was closed with 3-0 nylon as well. The wounds were   then dressed with antibiotic ointment and Adaptic followed by sterile   gauze. Sterile Webril was then applied. The drapes were removed and a   well-padded volar splint was applied to protect the fracture.     The patient was transferred back on to the hospital stretcher where he  was extubated and moved to the PACU in stable condition. Examination in  the PACU revealed sensation intact to all five fingers. There was brisk  cap refill demonstrated in all five fingers as well. The splint limited  motor examination; however, he did appear to be able to wiggle all five  fingers as well.         Lul Kay MD    D: 04/29/2022 15:48:01       T: 04/30/2022 1:49:35     [email or web address]  Job#: [id number]     Doc#: [id number]    CC:

## 2022-05-02 ENCOUNTER — OFFICE VISIT (OUTPATIENT)
Dept: ORTHOPEDIC SURGERY | Age: 22
End: 2022-05-02

## 2022-05-02 VITALS — RESPIRATION RATE: 16 BRPM | BODY MASS INDEX: 25.18 KG/M2 | WEIGHT: 170 LBS | HEIGHT: 69 IN

## 2022-05-02 DIAGNOSIS — S62.353A CLOSED NONDISPLACED FRACTURE OF SHAFT OF THIRD METACARPAL BONE OF LEFT HAND, INITIAL ENCOUNTER: Primary | ICD-10-CM

## 2022-05-02 PROCEDURE — 99024 POSTOP FOLLOW-UP VISIT: CPT | Performed by: PHYSICIAN ASSISTANT

## 2022-05-02 NOTE — PROGRESS NOTES
Mr. Colleen Pinedo is a 25 y.o. right handed man  who is seen today in Hand Surgical Consultation at the request of Dr. Thuy Early    He is seen today regarding an injury occurring on April 29th, 2022. He reports injuring his left hand, having accidentally shot it with a nail gun. while at work. He had surgery on 04/29/2022 by Dr. Onetha Klinefelter to have there retained nail removed. He was seen for Emergency evaluation elsewhere, radiographs were obtained & he has been immobilized. By report, there  was an associated skin injury. He reports mild pain located in the Dorsal aspect of the hand, no tenderness of the wrist or elbow. He notes today, no neurologic symptoms in the Whole Hand. Symptoms are improving over time. I have today reviewed with Colleen Pinedo the clinically relevant, past medical history, medications, allergies,  family history, social history, and Review Of Systems & I have documented any details relevant to today's presenting complaints in my history above. Mr. Massiel Franklin's self-reported past medical history, medications, allergies,  family history, social history, and Review Of Systems have been scanned into the chart under the \"Media\" tab. Physical Exam:  Mr. Alexia Quintana most recent vitals:  Vitals  Resp: 16  Height: 5' 9\" (175.3 cm)  Weight: 170 lb (77.1 kg)    He is well nourished, oriented to person, place & time. He demonstrates appropriate mood and affect as well as normal gait and station. Skin: Normal Texture and healing puncture wound on the volar ulnar aspect of the hand that is sutured and well healing dorsal incision that has been sutured. on the injured limb, normal on the contralateral side. Sutures remain intact.   Digital range of motion is limited by pain on the Left, normal on the Right  Wrist range of motion is without significant limitation bilaterally  Sensation is subjectively normal in the Whole Hand, other digits are bilaterally normally sensate  Vascular examination reveals normal, good capillary refill and good color bilaterally. There is mild acute ecchymosis on the Left, normal on the Right. Swelling is mild in the hand & digits on the Left, normal on the Right. There is no evidence of gross joint instability, excluding the immediate zone of injury, bilaterally. Muscular strength is clinically appropriate bilaterally, limited by pain in the injured extremity. Maximal pain is elicited with palpation of the mid Middle Finger Metacarpal  on the Left, normal on the Right. The distal radius & ulna are not tender to palpation. There is a 0 degree angular deformity and no clinical evidence of  mal-rotation. Radiographic Evaluation:  Radiographs, taken From a Hospital location outside of my practice were Personally Reviewed & Interpreted by myself today (3 views of the left hand). They demonstrate evidence of an acute nondisplaced fracture of the proximal metacarpal  with moderate comminution, 0 degrees of angular deformity and no  mal-rotation. There is not evidence of other injury or bony fracture. Impression:  Mr. Colleen Pinedo has sustained recent metacarpal fracture, non-displaced and presents requesting further treatment. Plan:  I have discussed with Mr. Colleen Pinedo the various treatment options for treatment of left Middle Finger metacarpal fracture. We discussed the options of Closed treatment in situ, attempted closed reduction & cast immobilization, and surgical treatments (Open Reduction & Internal Fixation or Closed Reduction & Percutaneous Pinning of the fracture). he has elected to proceed conservativly, voicing an understanding of the other options available to him. I have explained the complications, limitations, expectations, alternatives, & risks of his chosen treatment.   We discussed the possibility of residual symptoms as may be related to closed treatment of fractures including the possiblities of: mal-union, non-union, delayed union, persistant deformity, persistant pain, limitation of motion, future arthritic symptoms & the possible need for further treatment. He understood our discussion and was comfortable with his decision; he was provided with appropriate expectations. He is today fitted with a carefully applied, well padded & appropriately molded short arm fiberglass cast incorporating the Index Finger, Middle Finger, Ring Finger and Small Finger in an intrinsic safe position  He was given a Patient Instruction Sheet related to cast care. I have asked him to schedule a follow-up appointment for 4 weeks from now at which time we will obtain repeat radiographs of the hand out of splint/cast and suture removal.    He is specifically instructed to contact the office between now & his scheduled appointment if he has concerns related to the cast or the underlying fracture. He is welcome to call for an appointment sooner if he has any additional concerns or questions. I have also discussed with Mr. Rosan Boast  the other treatment options available to him  for this condition. We have today selected to proceed with conservative management. He and I have agreed that if our current course of conservative treatment does not prove to be effective over the short term future, that he will schedule a follow-up appointment to discuss and select an alternate course of therapy including possibly injection or surgical treatment. Mr. Rosan Boast has been given a full verbal list of instructions and precautions related to his present condition. I have asked him to followup with me in the office at the prescribed time. He is also specifically requested to call or return to the office sooner if his symptoms change or worsen prior to the next scheduled appointment.

## 2022-05-27 ENCOUNTER — HOSPITAL ENCOUNTER (EMERGENCY)
Age: 22
Discharge: HOME | End: 2022-05-27

## 2022-05-27 VITALS
OXYGEN SATURATION: 99 % | SYSTOLIC BLOOD PRESSURE: 133 MMHG | TEMPERATURE: 99.2 F | DIASTOLIC BLOOD PRESSURE: 82 MMHG | RESPIRATION RATE: 20 BRPM | HEART RATE: 100 BPM

## 2022-05-27 DIAGNOSIS — Z48.02 VISIT FOR SUTURE REMOVAL: Primary | ICD-10-CM

## 2022-05-27 PROCEDURE — 99282 EMERGENCY DEPT VISIT SF MDM: CPT

## 2022-05-27 ASSESSMENT — ENCOUNTER SYMPTOMS
ABDOMINAL PAIN: 0
WHEEZING: 0
DIARRHEA: 0
SHORTNESS OF BREATH: 0
COUGH: 0
RHINORRHEA: 0
VOMITING: 0
NAUSEA: 0

## 2022-05-27 ASSESSMENT — PAIN - FUNCTIONAL ASSESSMENT: PAIN_FUNCTIONAL_ASSESSMENT: NONE - DENIES PAIN

## 2022-05-28 NOTE — ED NOTES
Discharge and education instructions reviewed. Patient verbalized understanding, teach-back successful. Patient denied questions at this time. No acute distress noted. Patient instructed to follow-up as noted - return to emergency department if symptoms worsen. Patient verbalized understanding. Discharged per EDMD with discharged instructions.      Lavern Mccray RN  05/27/22 [id number]

## 2022-05-28 NOTE — ED PROVIDER NOTES
dysuria and hematuria. Musculoskeletal: Negative for neck pain and neck stiffness. Skin: Positive for wound. Negative for rash. Neurological: Negative for headaches. Positives and Pertinent negatives as per HPI. Except as noted above in the ROS, all other systems were reviewed and negative. PAST MEDICAL HISTORY   History reviewed. No pertinent past medical history. SURGICAL HISTORY     Past Surgical History:   Procedure Laterality Date    HAND SURGERY Left 4/29/2022    INCISION AND DRAINAGE LEFT HAND, REMOVAL FOREIGN BODY / NAIL performed by Loan Amador MD at [place] [address]    Per patient He is not on any OTC medication at this time. ALLERGIES     Patient has no known allergies. FAMILYHISTORY       Family History   Problem Relation Age of Onset    Depression Mother     Kidney Disease Mother     Alcohol Abuse Father     Kidney Disease Father     Depression Sister     Alcohol Abuse Brother     Stroke Maternal Grandmother     High Blood Pressure Maternal Grandmother     Alcohol Abuse Maternal Grandmother           SOCIAL HISTORY       Social History     Tobacco Use    Smoking status: Current Every Day Smoker     Packs/day: 0.10     Years: 2.00     Pack years: 0.20     Types: Cigarettes, Cigars    Smokeless tobacco: Never Used    Tobacco comment: 1 cigar per day   Substance Use Topics    Alcohol use: Yes     Comment: weekends    Drug use: No       SCREENINGS    Syed Coma Scale  Eye Opening: Spontaneous  Best Verbal Response: Oriented  Best Motor Response: Obeys commands  Syed Coma Scale Score: 15        PHYSICAL EXAM    (up to 7 for level 4, 8 or more for level 5)     ED Triage Vitals   BP Temp Temp src Pulse Resp SpO2 Height Weight   -- -- -- -- -- -- -- --       Physical Exam  Vitals and nursing note reviewed. Constitutional:       Appearance: He is well-developed. He is not diaphoretic. HENT:      Head: Normocephalic and atraumatic. Right Ear: External ear normal.      Left Ear: External ear normal.      Nose: Nose normal.   Eyes:      General:         Right eye: No discharge. Left eye: No discharge. Cardiovascular:      Pulses: Normal pulses. Pulmonary:      Effort: Pulmonary effort is normal. No respiratory distress. Musculoskeletal:         General: Normal range of motion. Cervical back: Normal range of motion and neck supple. Skin:     General: Skin is warm and dry. Findings: Laceration present. Neurological:      Mental Status: He is alert and oriented to person, place, and time. Sensory: Sensation is intact. Motor: Motor function is intact. Psychiatric:         Behavior: Behavior normal.         DIAGNOSTIC RESULTS   LABS:    Labs Reviewed - No data to display    When ordered only abnormal lab results are displayed. All other labs were within normal range or not returned as of this dictation. EKG: When ordered, EKG's are interpreted by the Emergency Department Physician in the absence of a cardiologist.  Please see their note for interpretation of EKG. RADIOLOGY:   Non-plain film images such as CT, Ultrasound and MRI are read by the radiologist. Plain radiographic images are visualized and preliminarily interpreted by the ED Provider with the below findings:        Interpretation per the Radiologist below, if available at the time of this note:    No orders to display     No results found. PROCEDURES   Unless otherwise noted below, none     Procedures    Suture/ Staple Removal Procedure Note  Indication: Wound healed    Procedure: The patient was placed in the appropriate position and the sutures were removed without difficulty. Other items: the wound appears well healed and Suture count: 4    The patient tolerated the procedure well.     Complications: None          CRITICAL CARE TIME       CONSULTS:  None      EMERGENCY DEPARTMENT COURSE and DIFFERENTIAL DIAGNOSIS/MDM:   Vitals:    Vitals:    05/27/22 2010   BP: 133/82   Pulse: 100   Resp: 20   Temp: 99.2 °F (37.3 °C)   TempSrc: Oral   SpO2: 99%       Patient was given the following medications:  Medications - No data to display      Is this patient to be included in the SEP-1 Core Measure due to severe sepsis or septic shock? No   Exclusion criteria - the patient is NOT to be included for SEP-1 Core Measure due to: Infection is not suspected    Patient presents for suture removal.  On exam, he is resting comfortably in bed no acute distress and nontoxic. Patient does have well approximated well-healing linear laceration of the dorsum of the left mid hand with 3 sutures in place. There is also a smaller laceration with 1 suture in place to the medial palmar aspect. All sutures removed per procedure note patient tolerated without difficulty. Continued symptomatic, supportive and wound care discussed including conditions for return to the ED such as any new or worsening symptoms or signs of neurovascular compromise, intractable pain, wound dehiscence/rebleed or infection. He is agreeable to this plan and stable for discharge at this time. FINAL IMPRESSION      1.  Visit for suture removal          DISPOSITION/PLAN   DISPOSITION Decision To Discharge 05/27/2022 08:08:56 PM      PATIENT REFERRED TO:  [facility] Emergency Department  [address] [place] [address]  [phone]  Go to   If symptoms worsen      DISCHARGE MEDICATIONS:  New Prescriptions    No medications on file       DISCONTINUED MEDICATIONS:  Discontinued Medications    No medications on file              (Please note that portions of this note were completed with a voice recognition program.  Efforts were made to edit the dictations but occasionally words are mis-transcribed.)    Deepali Vera PA-C (electronically signed)           Eve Box PA-C  05/27/22 2012

## (undated) DEVICE — DRESSING,GAUZE,XEROFORM,CURAD,1"X8",ST: Brand: CURAD

## (undated) DEVICE — BANDAGE COMPR W2INXL5YD TAN BRTH SELF ADH WRP W/ HND TEAR

## (undated) DEVICE — GLOVE ORANGE PI 8   MSG9080

## (undated) DEVICE — MEDICINE CUP, GRADUATED, STER: Brand: MEDLINE

## (undated) DEVICE — SUTURE ETHLN SZ 4-0 L18IN NONABSORBABLE BLK L19MM PS-2 3/8 1667H

## (undated) DEVICE — SHEET,DRAPE,53X77,STERILE: Brand: MEDLINE

## (undated) DEVICE — C-ARM: Brand: UNBRANDED

## (undated) DEVICE — SYRINGE IRRIG 60ML SFT PLIABLE BLB EZ TO GRP 1 HND USE W/

## (undated) DEVICE — DRAPE HND W114XL142IN BLU POLYPR W O PCH FEN CRD AND TB HLDR

## (undated) DEVICE — GAUZE,SPONGE,4"X4",8PLY,STRL,LF,10/TRAY: Brand: MEDLINE

## (undated) DEVICE — HYPODERMIC SAFETY NEEDLE: Brand: MAGELLAN

## (undated) DEVICE — PADDING CAST W4INXL4YD ST COT RAYON MICROPLEATED HIGHLY

## (undated) DEVICE — INTENDED FOR TISSUE SEPARATION, AND OTHER PROCEDURES THAT REQUIRE A SHARP SURGICAL BLADE TO PUNCTURE OR CUT.: Brand: BARD-PARKER ® STAINLESS STEEL BLADES

## (undated) DEVICE — TOWEL,OR,DSP,ST,BLUE,STD,4/PK,20PK/CS: Brand: MEDLINE

## (undated) DEVICE — TRAY PREP DRY W/ PREM GLV 2 APPL 6 SPNG 2 UNDPD 1 OVERWRAP

## (undated) DEVICE — GLOVE ORTHO 8   MSG9480

## (undated) DEVICE — SOLUTION IRRIG 500ML 0.9% SOD CHL USP POUR PLAS BTL

## (undated) DEVICE — GLOVE SURG SZ 65 THK91MIL LTX FREE SYN POLYISOPRENE

## (undated) DEVICE — ZIMMER® STERILE DISPOSABLE TOURNIQUET CUFF WITH PLC, DUAL PORT, DUAL BLADDER, 18 IN. (46 CM)

## (undated) DEVICE — GLOVE SURG SZ 65 L12IN THK75MIL DK GRN LTX FREE

## (undated) DEVICE — PACK PROCEDURE SURG EXTREMITY MFFOP CUST